# Patient Record
Sex: FEMALE | Race: WHITE | NOT HISPANIC OR LATINO | URBAN - METROPOLITAN AREA
[De-identification: names, ages, dates, MRNs, and addresses within clinical notes are randomized per-mention and may not be internally consistent; named-entity substitution may affect disease eponyms.]

---

## 2023-08-19 ENCOUNTER — LAB (OUTPATIENT)
Dept: LAB | Facility: LAB | Age: 28
End: 2023-08-19
Payer: COMMERCIAL

## 2023-08-19 LAB — TOBACCO SCREEN, URINE: NEGATIVE

## 2024-01-03 ENCOUNTER — CLINICAL SUPPORT (OUTPATIENT)
Dept: AUDIOLOGY | Facility: CLINIC | Age: 29
End: 2024-01-03
Payer: COMMERCIAL

## 2024-01-03 DIAGNOSIS — H93.13 SUBJECTIVE TINNITUS OF BOTH EARS: Primary | ICD-10-CM

## 2024-01-03 PROCEDURE — 92557 COMPREHENSIVE HEARING TEST: CPT | Performed by: AUDIOLOGIST

## 2024-01-03 PROCEDURE — 92550 TYMPANOMETRY & REFLEX THRESH: CPT | Performed by: AUDIOLOGIST

## 2024-01-03 ASSESSMENT — PAIN SCALES - GENERAL: PAINLEVEL_OUTOF10: 0 - NO PAIN

## 2024-01-03 ASSESSMENT — PAIN - FUNCTIONAL ASSESSMENT: PAIN_FUNCTIONAL_ASSESSMENT: 0-10

## 2024-01-03 NOTE — PROGRESS NOTES
Name: Juhi Márquez  YOB: 1995  Age: 28 y.o.    Date of Evaluation:  1/3/2024     History:  Reason for visit:  Juhi Márquez is seen today at the request of Gabi Richard CNP for an evaluation of hearing.  Patient complains of Tinnitus bilaterally. Juhi Márquez denies hearing loss, dizziness, aural fullness, ear infections, ear surgery, loud noise exposure, and family history of hearing loss.   She does report some sinus congestion and fullness.    Evaluation:    Otoscopy revealed clear ear canal and tympanic membrane visualized bilaterally.  Immittance testing indicated normal middle ear pressure, mobility, and ear canal volume bilaterally.   Ipsilateral acoustic reflexes were present at 500-4000 Hz bilaterally.     Behavioral hearing testing indicated normal hearing bilaterally.   Word recognition testing was completed using recorded speech at the patient's most comfortable level as documented on the audiogram.  Scores were excellent bilaterally.      Impression:    Testing indicated normal hearing and middle ear function bilaterally.     Care Plan:    Follow-up with Gabi Richard CNP.  Retest hearing as needed.    ABILIO Sweeney, CCC-A  Audiologist    Time:  [id number]

## 2024-01-12 ENCOUNTER — ANCILLARY PROCEDURE (OUTPATIENT)
Dept: RADIOLOGY | Facility: CLINIC | Age: 29
End: 2024-01-12
Payer: COMMERCIAL

## 2024-01-12 DIAGNOSIS — N93.9 ABNORMAL UTERINE AND VAGINAL BLEEDING, UNSPECIFIED: ICD-10-CM

## 2024-01-12 PROCEDURE — 76830 TRANSVAGINAL US NON-OB: CPT | Performed by: RADIOLOGY

## 2024-01-12 PROCEDURE — 76856 US EXAM PELVIC COMPLETE: CPT | Performed by: RADIOLOGY

## 2024-01-12 PROCEDURE — 76856 US EXAM PELVIC COMPLETE: CPT

## 2024-01-23 RX ORDER — NAPROXEN 500 MG/1
TABLET ORAL
COMMUNITY
Start: 2023-12-07

## 2024-01-23 RX ORDER — FLUTICASONE PROPIONATE 50 MCG
SPRAY, SUSPENSION (ML) NASAL
COMMUNITY
Start: 2023-12-26 | End: 2024-03-11 | Stop reason: ALTCHOICE

## 2024-01-23 RX ORDER — NORETHINDRONE ACETATE AND ETHINYL ESTRADIOL 1MG-20(21)
KIT ORAL
COMMUNITY
Start: 2023-11-24 | End: 2024-01-29 | Stop reason: WASHOUT

## 2024-01-29 ENCOUNTER — LAB (OUTPATIENT)
Dept: LAB | Facility: LAB | Age: 29
End: 2024-01-29
Payer: COMMERCIAL

## 2024-01-29 ENCOUNTER — OFFICE VISIT (OUTPATIENT)
Dept: OTOLARYNGOLOGY | Facility: CLINIC | Age: 29
End: 2024-01-29
Payer: COMMERCIAL

## 2024-01-29 VITALS
TEMPERATURE: 98.1 F | BODY MASS INDEX: 40.78 KG/M2 | SYSTOLIC BLOOD PRESSURE: 120 MMHG | DIASTOLIC BLOOD PRESSURE: 80 MMHG | WEIGHT: 216 LBS | HEIGHT: 61 IN | HEART RATE: 83 BPM

## 2024-01-29 DIAGNOSIS — H57.9 ITCHY EYES: ICD-10-CM

## 2024-01-29 DIAGNOSIS — R44.8 FACIAL PRESSURE: ICD-10-CM

## 2024-01-29 DIAGNOSIS — R09.81 NASAL CONGESTION: Primary | ICD-10-CM

## 2024-01-29 DIAGNOSIS — R06.7 SNEEZING: ICD-10-CM

## 2024-01-29 DIAGNOSIS — J34.89 NASAL DRAINAGE: ICD-10-CM

## 2024-01-29 DIAGNOSIS — R09.81 NASAL CONGESTION: ICD-10-CM

## 2024-01-29 PROCEDURE — 99204 OFFICE O/P NEW MOD 45 MIN: CPT | Performed by: NURSE PRACTITIONER

## 2024-01-29 PROCEDURE — 82785 ASSAY OF IGE: CPT

## 2024-01-29 PROCEDURE — 36415 COLL VENOUS BLD VENIPUNCTURE: CPT

## 2024-01-29 PROCEDURE — 99214 OFFICE O/P EST MOD 30 MIN: CPT | Performed by: NURSE PRACTITIONER

## 2024-01-29 PROCEDURE — 1036F TOBACCO NON-USER: CPT | Performed by: NURSE PRACTITIONER

## 2024-01-29 PROCEDURE — 86003 ALLG SPEC IGE CRUDE XTRC EA: CPT

## 2024-01-29 RX ORDER — LORATADINE 10 MG/1
10 TABLET ORAL
COMMUNITY
Start: 2023-11-07

## 2024-01-29 RX ORDER — ALBUTEROL SULFATE 90 UG/1
AEROSOL, METERED RESPIRATORY (INHALATION)
COMMUNITY
Start: 2022-07-18

## 2024-01-29 RX ORDER — PSEUDOEPHEDRINE HYDROCHLORIDE 60 MG/1
60 TABLET ORAL EVERY 6 HOURS PRN
COMMUNITY
Start: 2023-11-07

## 2024-01-29 RX ORDER — BISACODYL 5 MG
10 TABLET, DELAYED RELEASE (ENTERIC COATED) ORAL
COMMUNITY
Start: 2023-12-07

## 2024-01-29 RX ORDER — MEDROXYPROGESTERONE ACETATE 150 MG/ML
150 INJECTION, SUSPENSION INTRAMUSCULAR
COMMUNITY
Start: 2023-12-07

## 2024-01-29 RX ORDER — ACETAMINOPHEN 500 MG
2000 TABLET ORAL
COMMUNITY
Start: 2023-12-11

## 2024-01-29 RX ORDER — TRIAMCINOLONE ACETONIDE 55 UG/1
SPRAY, METERED NASAL
Qty: 16.5 G | Refills: 1 | Status: SHIPPED | OUTPATIENT
Start: 2024-01-29

## 2024-01-29 RX ORDER — AZELASTINE 1 MG/ML
SPRAY, METERED NASAL
Qty: 30 ML | Refills: 11 | Status: SHIPPED | OUTPATIENT
Start: 2024-01-29

## 2024-01-29 RX ORDER — UBIDECARENONE 75 MG
500 CAPSULE ORAL
COMMUNITY
Start: 2023-12-11

## 2024-01-29 SDOH — ECONOMIC STABILITY: FOOD INSECURITY: WITHIN THE PAST 12 MONTHS, THE FOOD YOU BOUGHT JUST DIDN'T LAST AND YOU DIDN'T HAVE MONEY TO GET MORE.: NEVER TRUE

## 2024-01-29 SDOH — ECONOMIC STABILITY: FOOD INSECURITY: WITHIN THE PAST 12 MONTHS, YOU WORRIED THAT YOUR FOOD WOULD RUN OUT BEFORE YOU GOT MONEY TO BUY MORE.: NEVER TRUE

## 2024-01-29 ASSESSMENT — ENCOUNTER SYMPTOMS
DEPRESSION: 0
OCCASIONAL FEELINGS OF UNSTEADINESS: 0
LOSS OF SENSATION IN FEET: 0

## 2024-01-29 ASSESSMENT — LIFESTYLE VARIABLES
HOW OFTEN DO YOU HAVE SIX OR MORE DRINKS ON ONE OCCASION: NEVER
SKIP TO QUESTIONS 9-10: 1
HOW OFTEN DO YOU HAVE A DRINK CONTAINING ALCOHOL: 2-4 TIMES A MONTH
HOW MANY STANDARD DRINKS CONTAINING ALCOHOL DO YOU HAVE ON A TYPICAL DAY: 1 OR 2
AUDIT-C TOTAL SCORE: 2

## 2024-01-29 ASSESSMENT — PATIENT HEALTH QUESTIONNAIRE - PHQ9
SUM OF ALL RESPONSES TO PHQ9 QUESTIONS 1 AND 2: 0
2. FEELING DOWN, DEPRESSED OR HOPELESS: NOT AT ALL
1. LITTLE INTEREST OR PLEASURE IN DOING THINGS: NOT AT ALL

## 2024-01-29 ASSESSMENT — COLUMBIA-SUICIDE SEVERITY RATING SCALE - C-SSRS
6. HAVE YOU EVER DONE ANYTHING, STARTED TO DO ANYTHING, OR PREPARED TO DO ANYTHING TO END YOUR LIFE?: NO
2. HAVE YOU ACTUALLY HAD ANY THOUGHTS OF KILLING YOURSELF?: NO
1. IN THE PAST MONTH, HAVE YOU WISHED YOU WERE DEAD OR WISHED YOU COULD GO TO SLEEP AND NOT WAKE UP?: NO

## 2024-01-29 ASSESSMENT — PAIN SCALES - GENERAL: PAINLEVEL: 0-NO PAIN

## 2024-01-29 NOTE — PROGRESS NOTES
Subjective   Patient ID: Juhi Munoz is a 28 y.o. female who presents for Sinusitis.    HPI  Patient here for reoccurring sinus issues. This has been going on for years, worsened since COVID in 2021. No allergy testing. She gets bad congestion. Mostly at night. She gets nasal drainage down the back of her throat. She feels if she lays on one side,the other clogs up. She gets some facial pressure with dizziness. Denies decreased smell. No previous sinonasal surgery.   She gets itchy eyes and sneezing.   She uses Flonase intermittently for years. No sinus rinses. Has tried saline spray in the past.  She also gets tinnitus in the ears that comes and goes.     There is no problem list on file for this patient.    No past surgical history on file.    Review of Systems    All other systems have been reviewed and are negative for complaints except for those mentioned in history of present illness, past medical history and problem list.    Objective   Physical Exam    Constitutional: No fever, chills, weight loss or weight gain  General appearance: Appears well, well-nourished, well groomed. No acute distress.    Communication: Normal communication    Psychiatric: Oriented to person, place and time. Normal mood and affect.    Neurologic: Cranial nerves II-XII grossly intact and symmetric bilaterally.    Head and Face:  Head: Atraumatic with no masses, lesions or scarring.  Face: Normal symmetry. No scars or deformities.  TMJ: Normal, no trismus.    Eyes: Conjunctiva not edematous or erythematous.     Right Ear: External inspection of ear with no deformity, scars, or masses. EAC is clear.  TM is intact with no sign of infection, effusion, or retraction.  No perforation seen.     Left Ear: External inspection of ear with no deformity, scars, or masses. EAC is clear.  TM is intact with no sign of infection, effusion, or retraction.  No perforation seen.     Nose: External inspection of nose: No nasal lesions, lacerations or  scars. Anterior rhinoscopy with limited visualization past the inferior turbinates. No tenderness on frontal or maxillary sinus palpation.    Oral Cavity/Mouth: Oral cavity and oropharynx mucosa moist and pink. No lesions or masses. Dentition normal. Tonsils appear normal. Uvula is midline. Tongue with no masses or lesions. Tongue with good mobility. The oropharynx is clear.    Neck: Normal appearing, symmetric, trachea midline.     Cardiovascular: Examination of peripheral vascular system shows no clubbing or cyanosis.    Respiratory: No respiratory distress increased work of breathing. Inspection of the chest with symmetric chest expansion and normal respiratory effort.    Skin: No head and neck rashes.    Lymph nodes: No adenopathy.     Diagnostic Results   I personally interpreted recent audiogram:      Assessment/Plan   Diagnoses and all orders for this visit:  Nasal congestion, nasal drainage,  facial pressure, itchy eyes and sneezing  -     Respiratory Allergy Profile IgE; Future. I will call with results.   - Start sinus rinses daily.   -     azelastine (Astelin) 137 mcg (0.1 %) nasal spray; Administer 2 sprays into each nostril twice daily. May continue Loratadine.   -     triamcinolone (Nasacort) 55 mcg nasal inhaler; Administer 2 sprays into each nostril one daily.    Follow up in 6-8 weeks.  If symptoms not improving, we will perform nasal endoscopy and consider additional medical therapy, allergy testing and/or CT sinus.    All questions answered to patient satisfaction.     SOL Hopkins-CNP 01/29/24 8:44 AM

## 2024-01-29 NOTE — PATIENT INSTRUCTIONS
- Start sinus rinses daily, see handout.  This can be purchased over the counter.   - I recommended Nasacort 2 sprays each nostril once a day. Patient was given instruction on proper application of the medication by spraying intranasally and aiming towards the outer corners of the eyes. Patient was instructed to avoid the septum due to the risk of nasal bleeding.  - Patient may add Azelastine nasal spray 2 sprays each nostril twice a day for better allergy control. You may continue your Loratadine.  - Obtain allergy testing. Go to any  lab to get this done.   - Follow up in 6-8 weeks. If not improving, will perform nasal endoscopy.    Welcome to Gabi Richard's clinic. We are here to assist you through your ENT care at [facility].  Gabi is a Nurse Practitioner who specializes in General ENT. This means that she specializes in taking care of patients with usual ENT issues such as nasal congestion, allergy symptoms, sinusitis, hearing loss, ear infections, ear wax removal, hoarseness, sore throat, throat infections, reflux and some swallowing issues. She also sees patients regarding dizziness and vertigo.   Clarita is Gabi's  and she answers the office phone from 7:30am-4pm Mon-Fri. Call [phone]. She can help you with scheduling of appointments, and general questions and information. You may need to leave a message if she is helping another patient. In this case, someone from the team will call you back the same day if you leave your message before 3pm, or the next business morning.  Gabi currently sees patients at [facility] on Mondays, Wednesdays and Thursdays.  She works closely with audiologists to solve issues with hearing. She is also in very close contact with her collaborative physicians. Dr. Chau, a surgeon who specializes in general ENT and rhinology. She also works closely with otologist (ear surgeon) Dr. Duncan and head and neck surgery  Dr. Valverde.   Others who may be included in your care are dieticians, social workers, allergists, gastroenterologists, neurologists, and physical therapists. Gabi will provide these referrals as needed. Please let her know if you would like to request a specific referral.  Gabi makes every effort to run on time for your appointments. Therefore, if you are more than 15 minutes late unrelated to a scan or another appointment such as therapy or audiology, your appointment will need to be rescheduled for another day. We appreciate your understanding.   We look forward to working with you to meet your healthcare goals.

## 2024-01-30 LAB
A ALTERNATA IGE QN: <0.1 KU/L
A FUMIGATUS IGE QN: <0.1 KU/L
BERMUDA GRASS IGE QN: <0.1 KU/L
BOXELDER IGE QN: <0.1 KU/L
C HERBARUM IGE QN: <0.1 KU/L
CALIF WALNUT POLN IGE QN: <0.1 KU/L
CAT DANDER IGE QN: <0.1 KU/L
CMN PIGWEED IGE QN: <0.1 KU/L
COMMON RAGWEED IGE QN: <0.1 KU/L
COTTONWOOD IGE QN: <0.1 KU/L
D FARINAE IGE QN: 1.7 KU/L
D PTERONYSS IGE QN: 1.65 KU/L
DOG DANDER IGE QN: <0.1 KU/L
ENGL PLANTAIN IGE QN: <0.1 KU/L
GOOSEFOOT IGE QN: <0.1 KU/L
JOHNSON GRASS IGE QN: <0.1 KU/L
KENT BLUE GRASS IGE QN: <0.1 KU/L
LONDON PLANE IGE QN: <0.1 KU/L
MT JUNIPER IGE QN: <0.1 KU/L
P NOTATUM IGE QN: <0.1 KU/L
PECAN/HICK TREE IGE QN: <0.1 KU/L
ROACH IGE QN: <0.1 KU/L
SALTWORT IGE QN: <0.1 KU/L
SHEEP SORREL IGE QN: <0.1 KU/L
SILVER BIRCH IGE QN: <0.1 KU/L
TIMOTHY IGE QN: <0.1 KU/L
TOTAL IGE SMQN RAST: 16.1 KU/L
WHITE ASH IGE QN: <0.1 KU/L
WHITE ELM IGE QN: <0.1 KU/L
WHITE MULBERRY IGE QN: <0.1 KU/L
WHITE OAK IGE QN: <0.1 KU/L

## 2024-03-11 ENCOUNTER — OFFICE VISIT (OUTPATIENT)
Dept: OTOLARYNGOLOGY | Facility: CLINIC | Age: 29
End: 2024-03-11
Payer: COMMERCIAL

## 2024-03-11 VITALS
BODY MASS INDEX: 41.72 KG/M2 | SYSTOLIC BLOOD PRESSURE: 130 MMHG | HEIGHT: 61 IN | DIASTOLIC BLOOD PRESSURE: 83 MMHG | HEART RATE: 76 BPM | TEMPERATURE: 97.6 F | WEIGHT: 221 LBS

## 2024-03-11 DIAGNOSIS — K21.9 LARYNGOPHARYNGEAL REFLUX (LPR): ICD-10-CM

## 2024-03-11 DIAGNOSIS — H57.9 ITCHY EYES: ICD-10-CM

## 2024-03-11 DIAGNOSIS — R06.7 SNEEZING: ICD-10-CM

## 2024-03-11 DIAGNOSIS — R09.A2 GLOBUS SENSATION: ICD-10-CM

## 2024-03-11 DIAGNOSIS — J34.89 NASAL DRAINAGE: Primary | ICD-10-CM

## 2024-03-11 DIAGNOSIS — R09.89 PHLEGM IN THROAT: ICD-10-CM

## 2024-03-11 PROCEDURE — 31231 NASAL ENDOSCOPY DX: CPT | Performed by: NURSE PRACTITIONER

## 2024-03-11 PROCEDURE — 99213 OFFICE O/P EST LOW 20 MIN: CPT | Performed by: NURSE PRACTITIONER

## 2024-03-11 PROCEDURE — 1036F TOBACCO NON-USER: CPT | Performed by: NURSE PRACTITIONER

## 2024-03-11 RX ORDER — OMEPRAZOLE 40 MG/1
CAPSULE, DELAYED RELEASE ORAL
Qty: 30 CAPSULE | Refills: 1 | Status: SHIPPED | OUTPATIENT
Start: 2024-03-11

## 2024-03-11 ASSESSMENT — ENCOUNTER SYMPTOMS
DEPRESSION: 0
OCCASIONAL FEELINGS OF UNSTEADINESS: 0
LOSS OF SENSATION IN FEET: 0

## 2024-03-11 ASSESSMENT — PAIN SCALES - GENERAL: PAINLEVEL: 0-NO PAIN

## 2024-03-11 NOTE — PATIENT INSTRUCTIONS
"- Continue Flonase and Azelastine.   - Start Omeprazole 40 mg daily. Take this before breakfast. Follow dietary/lifestyle modifications as listed below.  - Follow up in 8 weeks.     LARYNGOPHARYNGEAL REFLUX (LPR)   A common cause of hoarseness or voice changes is gastroesophageal reflux disease (GERD). GERD occurs when stomach acid flows back up into the esophagus (swallowing tube). When the acid reaches the throat, it is called laryngopharyngeal reflux (LPR) or silent reflux. It is called \"silent\" because up to 50% of people with LPR have no heartburn or stomach upset. This reflux can cause inflammation and swelling in the throat or in the larynx (voice box) and can result in a number of different symptoms.  · Hoarseness  · Sensation of lump in the throat (globus sensation)   · Frequent throat clearing  · Cough (may wake you up at night)  · Mucous sticking in the throat  · Low grade sore throat  · Worsening of asthma   · Worsening of sinus drainage or post nasal drip    The diagnosis of LPR as a cause of throat symptoms is usually based on classic symptoms and physical findings of inflammation in the throat (back part of the voicebox) that is assessed by performing a small in office procedure called a flexible nasopharyngoscopy. Often the patient is treated for LPR without any further testing and more significant testing is usually not needed. However, on some occasion, some additional tests may be required such as a swallowing study with barium, an endoscopic evaluation of the esophagus and stomach, or a probe that measures acidity (pH) in the throat and esophagus.  The most important treatment of LPR is lifestyle changes and dietary control. Lifestyle changes include talking with your primary healthcare provider about losing weight if you are currently overweight.  If you smoke, quit! Your primary healthcare provider will have suggestions to help you quit smoking. Avoid eating anything for at least 3-4 hours " before bedtime.  Elevate the head of the bed by using extra pillows or placing blocks under the legs of the bed to help reduce the amount of acid reaching your throat as you lay down. Avoid tight, binding. Below is a list of dietary guidelines:  · Eat smaller, more frequent meals rather than large one.   · Avoid food or liquids for 2-3 hours before lying down (no bedtime snacks!)   · Avoid or limit caffeinated products such as coffee, tea, soda, and chocolate  · Avoid or limit red sauces and salsa  · Avoid or limit fatty, fried, spicy or greasy foods  · Avoid or limit citric juices such as orange and grapefruit juice  · Avoid or limit mints such as peppermint and spearmint  · Avoid or limit alcohol   You may be prescribed anti-reflux medication.  These are most commonly the class of protein pump inhibitors such as Omeprazole, Esomeprazole, Pantoprazole, and Lansoprazole. These medications act by preventing acid production, not by neutralizing acid already present in the stomach and need to be taken 30 minutes before your biggest meal.  These medications can take up to 4 weeks to have effect, so it is important to take these consistently along with lifestyle and dietary modifications until your follow up appointment.    Untreated LPR can lead to chronic swelling of the vocal folds, ulcerations of the vocal folds and formation of masses known as granulomas.    Welcome to Gabi Richard's clinic. We are here to assist you through your ENT care at [facility].  Gabi is a Nurse Practitioner who specializes in General ENT. This means that she specializes in taking care of patients with usual ENT issues such as nasal congestion, allergy symptoms, sinusitis, hearing loss, ear infections, ear wax removal, hoarseness, sore throat, throat infections, reflux and some swallowing issues. She also sees patients regarding dizziness and vertigo.   Clarita is Gabi's  and she answers the office phone from  7:30am-4pm Mon-Fri. Call [phone]. She can help you with scheduling of appointments, and general questions and information. You may need to leave a message if she is helping another patient. In this case, someone from the team will call you back the same day if you leave your message before 3pm, or the next business morning.  Gabi currently sees patients at [facility] on Mondays, Wednesdays and Thursdays.  She works closely with audiologists to solve issues with hearing. She is also in very close contact with her collaborative physicians. Dr. Chau, a surgeon who specializes in general ENT and rhinology. She also works closely with otologist (ear surgeon) Dr. Duncan and head and neck surgery Dr. Valverde.   Others who may be included in your care are dieticians, social workers, allergists, gastroenterologists, neurologists, and physical therapists. Gabi will provide these referrals as needed. Please let her know if you would like to request a specific referral.  Gabi makes every effort to run on time for your appointments. Therefore, if you are more than 15 minutes late unrelated to a scan or another appointment such as therapy or audiology, your appointment will need to be rescheduled for another day. We appreciate your understanding.   We look forward to working with you to meet your healthcare goals.

## 2024-03-11 NOTE — PROGRESS NOTES
Subjective   Patient ID: Juhi Munoz is a 28 y.o. female who presents for Follow-up (6 week sinus).    HPI  INITIAL VISIT 1/29/2024:  Patient here for reoccurring sinus issues. This has been going on for years, worsened since COVID in 2021. No allergy testing. She gets bad congestion. Mostly at night. She gets nasal drainage down the back of her throat. She feels if she lays on one side,the other clogs up. She gets some facial pressure with dizziness. Denies decreased smell. No previous sinonasal surgery.   She gets itchy eyes and sneezing.   She uses Flonase intermittently for years. No sinus rinses. Has tried saline spray in the past.  She also gets tinnitus in the ears that comes and goes.     3/11/2024: Patient following up for her sinonasal complaints. Didn't do the rinses.Taking the Nasacort and Azelastine regularly- she does forget sometimes. Still getting mucous down the back of her throat. The congestion has much improved. No facial pain or pressure.   She does get acid reflux/heartburn. Doesn't take any medication for this.     There is no problem list on file for this patient.    History reviewed. No pertinent surgical history.    Review of Systems    All other systems have been reviewed and are negative for complaints except for those mentioned in history of present illness, past medical history and problem list.    Objective   Physical Exam    Constitutional: No fever, chills, weight loss or weight gain  General appearance: Appears well, well-nourished, well groomed. No acute distress.    Communication: Normal communication    Psychiatric: Oriented to person, place and time. Normal mood and affect.    Neurologic: Cranial nerves II-XII grossly intact and symmetric bilaterally.    Head and Face:  Head: Atraumatic with no masses, lesions or scarring.  Face: Normal symmetry. No scars or deformities.  TMJ: Normal, no trismus.    Eyes: Conjunctiva not edematous or erythematous.     Right Ear: External  inspection of ear with no deformity, scars, or masses. EAC is clear.  TM is intact with no sign of infection, effusion, or retraction.  No perforation seen.     Left Ear: External inspection of ear with no deformity, scars, or masses. EAC is clear.  TM is intact with no sign of infection, effusion, or retraction.  No perforation seen.     Nose: External inspection of nose: No nasal lesions, lacerations or scars. Anterior rhinoscopy with limited visualization past the inferior turbinates. No tenderness on frontal or maxillary sinus palpation.    Oral Cavity/Mouth: Oral cavity and oropharynx mucosa moist and pink. No lesions or masses. Dentition normal. Tonsils appear normal. Uvula is midline. Tongue with no masses or lesions. Tongue with good mobility. The oropharynx is clear.    Neck: Normal appearing, symmetric, trachea midline.     Cardiovascular: Examination of peripheral vascular system shows no clubbing or cyanosis.    Respiratory: No respiratory distress increased work of breathing. Inspection of the chest with symmetric chest expansion and normal respiratory effort.    Skin: No head and neck rashes.    Lymph nodes: No adenopathy.     Nasal / sinus endoscopy    Date/Time: 3/11/2024 8:51 AM    Performed by: CARLOS Hopkins  Authorized by: CARLOS Hopkins    Consent:     Consent obtained:  Verbal  Procedure details:     Indications: sino-nasal symptoms      Medication:  Afrin (4% topical lidcoaine)    Instrument: flexible fiberoptic nasal endoscope      Scope location: bilateral nare    Nasal cavity:     right nasal cavity not occluded, left nasal cavity not occluded, no right nasal cavity polyps and no left nasal cavity polyps      Right inferior turbinates:      normal    Left inferior turbinates:      normal    Septum:     normal    Sinus:     Right middle meatus:       normal        patent        no purulence      Left middle meatus:       normal        patent      no purulence     Right nasopharynx:       normal      patent      adenoid hypertrophy    Left nasopharynx:       normal      patent      adenoid hypertrophy    Right Eustachian tube orifices:       normal      patent    Left Eustachian tube orifices:       normal      patent  Comments:      Adenoids inflamed, enlarged.   Brief view of larynx shows postcricoid edema, evidence of LPR. No lesions.     Diagnostic Results   I personally interpreted recent audiogram:      Respiratory allergy profile obtained 1/29/2024 moderately positive to dust mites ( D. Farinae and D. Pteronyssinus)  Assessment/Plan   Diagnoses and all orders for this visit:  Nasal endoscopy performed. Brief view of larynx showed evidence of LPR.  Nasal drainage,  facial pressure, itchy eyes and sneezing, phlegm in throat.  Continue Nasacort and Azelastine.   Globus Sensation, LPR  Start Omeprazole daily before breakfast. This was sent to pharmacy.  Follow dietary/lifestyle modifications.     Follow up in 6-8 weeks.  All questions answered to patient satisfaction.     SOL Hopkins-CNP 03/11/24 8:36 AM

## 2024-05-01 ENCOUNTER — OFFICE VISIT (OUTPATIENT)
Dept: OTOLARYNGOLOGY | Facility: CLINIC | Age: 29
End: 2024-05-01
Payer: COMMERCIAL

## 2024-05-01 VITALS — WEIGHT: 223 LBS | TEMPERATURE: 97.7 F | HEIGHT: 61 IN | BODY MASS INDEX: 42.1 KG/M2

## 2024-05-01 DIAGNOSIS — R06.7 SNEEZING: ICD-10-CM

## 2024-05-01 DIAGNOSIS — J34.89 NASAL DRAINAGE: ICD-10-CM

## 2024-05-01 DIAGNOSIS — R09.89 PHLEGM IN THROAT: ICD-10-CM

## 2024-05-01 DIAGNOSIS — H57.9 ITCHY EYES: ICD-10-CM

## 2024-05-01 DIAGNOSIS — K21.9 LARYNGOPHARYNGEAL REFLUX (LPR): Primary | ICD-10-CM

## 2024-05-01 PROCEDURE — 99213 OFFICE O/P EST LOW 20 MIN: CPT | Performed by: NURSE PRACTITIONER

## 2024-05-01 PROCEDURE — 1036F TOBACCO NON-USER: CPT | Performed by: NURSE PRACTITIONER

## 2024-05-01 ASSESSMENT — ENCOUNTER SYMPTOMS
DEPRESSION: 0
OCCASIONAL FEELINGS OF UNSTEADINESS: 0
LOSS OF SENSATION IN FEET: 0

## 2024-05-01 ASSESSMENT — PAIN SCALES - GENERAL: PAINLEVEL: 0-NO PAIN

## 2024-05-01 NOTE — PROGRESS NOTES
Subjective   Patient ID: Juhi Munoz is a 28 y.o. female who presents for No chief complaint on file..    HPI  INITIAL VISIT 1/29/2024:  Patient here for reoccurring sinus issues. This has been going on for years, worsened since COVID in 2021. No allergy testing. She gets bad congestion. Mostly at night. She gets nasal drainage down the back of her throat. She feels if she lays on one side,the other clogs up. She gets some facial pressure with dizziness. Denies decreased smell. No previous sinonasal surgery.   She gets itchy eyes and sneezing.   She uses Flonase intermittently for years. No sinus rinses. Has tried saline spray in the past.  She also gets tinnitus in the ears that comes and goes.     3/11/2024: Patient following up for her sinonasal complaints. Didn't do the rinses. Taking the Nasacort and Azelastine regularly- she does forget sometimes. Still getting mucous down the back of her throat. The congestion has much improved. No facial pain or pressure.   She does get acid reflux/heartburn. Doesn't take any medication for this.     5/1/2024: Patient here for follow up. The Omeprazole is helping significantly . If she forgets the medication, symptoms worsen. Throat is feeling better. Does still get congested, using the Nasacort and Azelastine. Takes Loratadine daily.   Gets headaches when she lays flat. Sometimes her vision is blurry. Some facial pressure.     There is no problem list on file for this patient.    No past surgical history on file.    Review of Systems    All other systems have been reviewed and are negative for complaints except for those mentioned in history of present illness, past medical history and problem list.    Objective   Physical Exam    Constitutional: No fever, chills, weight loss or weight gain  General appearance: Appears well, well-nourished, well groomed. No acute distress.    Communication: Normal communication    Psychiatric: Oriented to person, place and time. Normal  mood and affect.    Neurologic: Cranial nerves II-XII grossly intact and symmetric bilaterally.    Head and Face:  Head: Atraumatic with no masses, lesions or scarring.  Face: Normal symmetry. No scars or deformities.  TMJ: Normal, no trismus.    Eyes: Conjunctiva not edematous or erythematous.     Right Ear: External inspection of ear with no deformity, scars, or masses. EAC is clear.  TM is intact with no sign of infection, effusion, or retraction.  No perforation seen.     Left Ear: External inspection of ear with no deformity, scars, or masses. EAC is clear.  TM is intact with no sign of infection, effusion, or retraction.  No perforation seen.     Nose: External inspection of nose: No nasal lesions, lacerations or scars. Anterior rhinoscopy with limited visualization past the inferior turbinates. No tenderness on frontal or maxillary sinus palpation.    Oral Cavity/Mouth: Oral cavity and oropharynx mucosa moist and pink. No lesions or masses. Dentition normal. Tonsils appear normal. Uvula is midline. Tongue with no masses or lesions. Tongue with good mobility. The oropharynx is clear.    Neck: Normal appearing, symmetric, trachea midline.     Cardiovascular: Examination of peripheral vascular system shows no clubbing or cyanosis.    Respiratory: No respiratory distress increased work of breathing. Inspection of the chest with symmetric chest expansion and normal respiratory effort.    Skin: No head and neck rashes.    Lymph nodes: No adenopathy.     Diagnostic Results   I personally interpreted recent audiogram:      Respiratory allergy profile obtained 1/29/2024 moderately positive to dust mites ( D. Farinae and D. Pteronyssinus)    Assessment/Plan   Diagnoses and all orders for this visit:  Patient will continue Omeprazole. Taper to every other day for the last week. I referred to GI, as when she misses medication her symptoms return.  I also placed allergy referral due to patient's nasal congestion, itchy  nose/eyes and sneezing. Continue Nasacort, Azelastine and Loratadine.   We discussed CT sinus due to facial pressure, but patient would like to think about this. Will let me know if she wants to pursue this. Otherwise, we can follow up as needed as she sees additional providers.     All questions answered to patient satisfaction.     SOL Hopkins-CNP 05/01/24 8:13 AM

## 2024-07-11 ENCOUNTER — APPOINTMENT (OUTPATIENT)
Dept: GASTROENTEROLOGY | Facility: CLINIC | Age: 29
End: 2024-07-11
Payer: COMMERCIAL

## 2024-08-28 NOTE — PROGRESS NOTES
Subjective   Patient ID: Juhi Munoz is a 28 y.o. female who presents for GERD (Has had GERD for many years, but now it is getting worse with constant nausea/bloating/abdominal pain/excessive gas. Also has issues with constipation/diarrhea- gets rectal and back pain that feels like someone is stabbing her. ).  HPI  Patient is a 28-year-old woman with morbid obesity and anxiety.  She is currently not on any occasions.  Symptom: > 2 years.  Nausea ; almost daily. Has been there for a 2 year.  GERD /Burping up food. PPI has helped.   Flatulence and bloating.  Generalized abdominal discomfort.  Sometimes problem swallowing.  Lower abdominal pain.     No wt. Loss.  BM: Few times a day.      Anxiety is better.      Current Outpatient Medications on File Prior to Visit   Medication Sig Dispense Refill    albuterol (Ventolin HFA) 90 mcg/actuation inhaler INHALE 2 PUFFS INTO THE LUNGS EVERY 4 HOURS AS NEEDED FOR SHORTNESS OF BREATH OR WHEEZING      azelastine (Astelin) 137 mcg (0.1 %) nasal spray Administer 2 sprays into each nostril twice daily. 30 mL 11    bisacodyl (Dulcolax) 5 mg EC tablet Take 2 tablets (10 mg) by mouth.      cholecalciferol (Vitamin D-3) 50 mcg (2,000 unit) capsule Take 1 capsule (50 mcg) by mouth once daily.      cyanocobalamin (Vitamin B-12) 500 mcg tablet Take 1 tablet (500 mcg) by mouth once daily.      loratadine (Claritin) 10 mg tablet Take 1 tablet (10 mg) by mouth.      naproxen (Naprosyn) 500 mg tablet       pseudoephedrine (Sudafed) 60 mg tablet Take 1 tablet (60 mg) by mouth every 6 hours if needed.      triamcinolone (Nasacort) 55 mcg nasal inhaler Administer 2 sprays into each nostril one daily. 16.5 g 1    medroxyPROGESTERone 150 mg/mL injection Inject 1 mL (150 mg) into the muscle.      omeprazole (PriLOSEC) 40 mg DR capsule Take daily before breakfast. (Patient not taking: Reported on 8/29/2024) 30 capsule 1     No current facility-administered medications on file prior to visit.     "    Review of Systems   Constitutional: Negative.    Respiratory: Negative.     Cardiovascular: Negative.    Gastrointestinal:  Positive for abdominal distention, abdominal pain, constipation, diarrhea and nausea.   Endocrine: Negative.    Genitourinary: Negative.    Skin: Negative.    Neurological: Negative.        Objective   Physical Exam  Constitutional:       Appearance: Normal appearance.   HENT:      Head: Normocephalic and atraumatic.   Cardiovascular:      Rate and Rhythm: Normal rate and regular rhythm.      Pulses: Normal pulses.      Heart sounds: Normal heart sounds.   Pulmonary:      Effort: Pulmonary effort is normal.      Breath sounds: Normal breath sounds.   Abdominal:      General: Abdomen is flat. Bowel sounds are normal.      Palpations: Abdomen is soft.      Tenderness: There is abdominal tenderness.   Musculoskeletal:         General: Normal range of motion.      Cervical back: Normal range of motion.   Skin:     General: Skin is warm.   Neurological:      Mental Status: She is alert.       /80 (BP Location: Right arm, Patient Position: Sitting, BP Cuff Size: Large adult)   Pulse 76   Resp 18   Ht 1.556 m (5' 1.25\")   Wt 101 kg (222 lb 6.4 oz)   SpO2 98%   BMI 41.68 kg/m²      No results found for: \"WBC\", \"HGB\", \"HCT\", \"MCV\", \"PLT\"        No lab exists for component: \"LABALBU\"    No results found for: \"AFP\"  No results found for: \"TSH\"    Nasal / sinus endoscopy  Order: [id number]   Status: Final result       Visible to patient: Yes (seen)       Next appt: 08/29/2024 at 08:20 AM in Gastroenterology (Luis Luna MD)       Dx: Nasal drainage; Phlegm in throat    0 Result Notes        Narrative    CARLOS Hopkins     3/11/2024 10:51 AM  Nasal / sinus endoscopy    Date/Time: 3/11/2024 8:51 AM    Performed by: CARLOS Hopkins  Authorized by: CARLOS Hopkins    Consent:    Consent obtained:  Verbal  Procedure details:    Indications: sino-nasal " symptoms      Medication:  Afrin (4% topical lidcoaine)    Instrument: flexible fiberoptic nasal endoscope      Scope location: bilateral nare    Nasal cavity:    right nasal cavity not occluded, left nasal cavity not occluded, no  right nasal cavity polyps and no left nasal cavity polyps      Right inferior turbinates:      normal    Left inferior turbinates:      normal    Septum:    normal    Sinus:    Right middle meatus:      normal        patent        no purulence      Left middle meatus:      normal        patent      no purulence    Right nasopharynx:      normal      patent      adenoid hypertrophy    Left nasopharynx:      normal      patent      adenoid hypertrophy    Right Eustachian tube orifices:      normal      patent    Left Eustachian tube orifices:      normal      patent  Comments:     Adenoids inflamed, enlarged.  Brief view of larynx shows postcricoid edema, evidence of LPR. No lesions.        Assessment/Plan   Diagnoses and all orders for this visit:  Bloating  -     Esophagogastroduodenoscopy (EGD); Future  Laryngopharyngeal reflux (LPR)  -     Referral to Gastroenterology  -     Esophagogastroduodenoscopy (EGD); Future  Lower abdominal pain  -     Colonoscopy Diagnostic; Future  -     sodium,potassium,mag sulfates (Suprep) 17.5-3.13-1.6 gram recon soln solution; Take one bottle twice as directed by the prep instructions  -     Calprotectin Stool; Future  -     Celiac Panel; Future  -     CBC; Future  -     Comprehensive metabolic panel; Future  Nausea  -     Esophagogastroduodenoscopy (EGD); Future  -     Calprotectin Stool; Future  -     Celiac Panel; Future  -     CBC; Future  -     Comprehensive metabolic panel; Future    Low FODMAP diet.  In addition > Meditation or Yoga daily.  Follow up in 6 months.

## 2024-08-29 ENCOUNTER — APPOINTMENT (OUTPATIENT)
Dept: GASTROENTEROLOGY | Facility: CLINIC | Age: 29
End: 2024-08-29
Payer: COMMERCIAL

## 2024-08-29 VITALS
SYSTOLIC BLOOD PRESSURE: 124 MMHG | RESPIRATION RATE: 18 BRPM | BODY MASS INDEX: 41.99 KG/M2 | HEART RATE: 76 BPM | HEIGHT: 61 IN | WEIGHT: 222.4 LBS | OXYGEN SATURATION: 98 % | DIASTOLIC BLOOD PRESSURE: 80 MMHG

## 2024-08-29 DIAGNOSIS — K21.9 LARYNGOPHARYNGEAL REFLUX (LPR): ICD-10-CM

## 2024-08-29 DIAGNOSIS — R14.0 BLOATING: Primary | ICD-10-CM

## 2024-08-29 DIAGNOSIS — R11.0 NAUSEA: ICD-10-CM

## 2024-08-29 DIAGNOSIS — R10.30 LOWER ABDOMINAL PAIN: ICD-10-CM

## 2024-08-29 PROCEDURE — 3008F BODY MASS INDEX DOCD: CPT | Performed by: INTERNAL MEDICINE

## 2024-08-29 PROCEDURE — 1036F TOBACCO NON-USER: CPT | Performed by: INTERNAL MEDICINE

## 2024-08-29 PROCEDURE — 99205 OFFICE O/P NEW HI 60 MIN: CPT | Performed by: INTERNAL MEDICINE

## 2024-08-29 RX ORDER — SODIUM, POTASSIUM,MAG SULFATES 17.5-3.13G
SOLUTION, RECONSTITUTED, ORAL ORAL
Qty: 354 ML | Refills: 0 | Status: SHIPPED | OUTPATIENT
Start: 2024-08-29

## 2024-08-29 ASSESSMENT — ENCOUNTER SYMPTOMS
ABDOMINAL DISTENTION: 1
CARDIOVASCULAR NEGATIVE: 1
NEUROLOGICAL NEGATIVE: 1
DIARRHEA: 1
NAUSEA: 1
ABDOMINAL PAIN: 1
CONSTITUTIONAL NEGATIVE: 1
ENDOCRINE NEGATIVE: 1
CONSTIPATION: 1
RESPIRATORY NEGATIVE: 1

## 2024-08-29 NOTE — PATIENT INSTRUCTIONS
Bloating  -     Esophagogastroduodenoscopy (EGD); Future  Laryngopharyngeal reflux (LPR)  -     Referral to Gastroenterology  -     Esophagogastroduodenoscopy (EGD); Future  Lower abdominal pain  -     Colonoscopy Diagnostic; Future  -     sodium,potassium,mag sulfates (Suprep) 17.5-3.13-1.6 gram recon soln solution; Take one bottle twice as directed by the prep instructions  -     Calprotectin Stool; Future  -     Celiac Panel; Future  -     CBC; Future  -     Comprehensive metabolic panel; Future  Nausea  -     Esophagogastroduodenoscopy (EGD); Future  -     Calprotectin Stool; Future  -     Celiac Panel; Future  -     CBC; Future  -     Comprehensive metabolic panel; Future    Low FODMAP diet.  In addition > Meditation or Yoga daily.  Follow up in 6 months.

## 2024-08-30 ENCOUNTER — TELEPHONE (OUTPATIENT)
Dept: GASTROENTEROLOGY | Facility: CLINIC | Age: 29
End: 2024-08-30
Payer: COMMERCIAL

## 2024-09-02 PROBLEM — J30.9 ALLERGIC RHINITIS: Status: ACTIVE | Noted: 2024-09-02

## 2024-09-03 ENCOUNTER — APPOINTMENT (OUTPATIENT)
Dept: ALLERGY | Facility: CLINIC | Age: 29
End: 2024-09-03
Payer: COMMERCIAL

## 2024-09-03 VITALS — HEIGHT: 61 IN | BODY MASS INDEX: 42.29 KG/M2 | WEIGHT: 224 LBS | HEART RATE: 91 BPM

## 2024-09-03 DIAGNOSIS — R06.7 SNEEZING: ICD-10-CM

## 2024-09-03 DIAGNOSIS — R09.89 PHLEGM IN THROAT: ICD-10-CM

## 2024-09-03 DIAGNOSIS — J30.9 ALLERGIC RHINITIS, UNSPECIFIED SEASONALITY, UNSPECIFIED TRIGGER: Primary | ICD-10-CM

## 2024-09-03 DIAGNOSIS — H57.9 ITCHY EYES: ICD-10-CM

## 2024-09-03 DIAGNOSIS — J34.89 NASAL DRAINAGE: ICD-10-CM

## 2024-09-03 PROCEDURE — 95024 IQ TESTS W/ALLERGENIC XTRCS: CPT | Performed by: ALLERGY & IMMUNOLOGY

## 2024-09-03 PROCEDURE — 99214 OFFICE O/P EST MOD 30 MIN: CPT | Performed by: ALLERGY & IMMUNOLOGY

## 2024-09-03 PROCEDURE — 95004 PERQ TESTS W/ALRGNC XTRCS: CPT | Performed by: ALLERGY & IMMUNOLOGY

## 2024-09-03 RX ORDER — MONTELUKAST SODIUM 10 MG/1
10 TABLET ORAL DAILY
Qty: 30 TABLET | Refills: 5 | Status: SHIPPED | OUTPATIENT
Start: 2024-09-03 | End: 2025-03-02

## 2024-09-03 ASSESSMENT — ENCOUNTER SYMPTOMS
EYE ITCHING: 1
RHINORRHEA: 1

## 2024-09-03 NOTE — PATIENT INSTRUCTIONS
Skin testing is positive to dust mite.    Start Singulair at bedtime to help decrease sinus and lung inflammation.  Side effects reported vivid dreams,  mood changes as well as ear popping.  If you experience ear popping, this indicates the medication is working so please continue to take it.     Continue Nasacort and azelastine.  To increase the efficacy of your nasal spray, be sure to look down while using it.? Spray slightly away from the direction of your nasal septum (the bone in the middle of your nose) and only sniff after you have sprayed - avoid spraying and sniffing at the same time, or else a lot of spray will go down your throat.      Consider sublingual Odactra vs allergy immunotherapy.

## 2024-09-03 NOTE — ASSESSMENT & PLAN NOTE
She C/O years of recurring sinus issues worse since she had COVID in 2021.  She experiences severe nasal congestion mostly at night, PND, facial pressure, dizziness, itchy eyes and ears and sneezing despite Flonase, azelastine and Nasacort.    Skin testing is positive to dust mite.    She can continue her Flonase, Nasacort and azelastine.    Start Singulair (montelukast) at night.  Reviewed side effects and benefits.      I discussed the options of sublingual Odactra vs allergy immunotherapy.

## 2024-09-03 NOTE — PROGRESS NOTES
"  Subjective   Patient ID:   [id number]   Juhi Munoz is a 28 y.o. female who presents for Allergies.    Chief Complaint   Patient presents with    Allergies     This is a new patient, referred by CARLOS Hopkins, Otolaryngology, for evaluation of sinus complaints.    Patient reports years of recurring sinus issues that have worsened since COVID in 2021.  She experiences severe nasal congestion mostly at night, which alternates from left to right nostril depending on the side she lies on.  However, the congestion has improved.  She also experienced PND, facial pressure, dizziness, itchy eyes and ears and sneezing.  She used Visine intermittently but admits to dry eyes.  She has intermittent tinnitus but denies anosmia.  She has had no prior sinonasal surgery.    She uses Flonase, Nasacort and azelastine, which help but do not completely resolve her chronic congestion and eye itching.  She also uses loratadine but still sneezes especially at work.        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  She took omeprazole, which helped but she was told to stop it to avoid GI issues.  Patient saw Gabi Richard who performed sinonasal endoscopy and was told she had large adenoids, but they were mild.  She does not recall Gabi citing any other findings.    Patient denies any breathing issues or H/O asthma.    Patient works at [employer] as a nurse x3 years.    Review of Systems   HENT:  Positive for congestion, postnasal drip and rhinorrhea.    Eyes:  Positive for itching.        Dry eyes.     Objective   Pulse 91   Ht 1.556 m (5' 1.25\")   Wt 102 kg (224 lb)   BMI 41.98 kg/m²      Physical Exam  Constitutional:       Appearance: Normal appearance.   HENT:      Head: Normocephalic and atraumatic.      Right Ear: External ear normal. There is no impacted cerumen.      Left Ear: External ear normal. There is no impacted cerumen.      Nose: No congestion or " rhinorrhea.   Eyes:      Extraocular Movements: Extraocular movements intact.      Conjunctiva/sclera: Conjunctivae normal.      Pupils: Pupils are equal, round, and reactive to light.   Cardiovascular:      Rate and Rhythm: Normal rate and regular rhythm.      Heart sounds: No murmur heard.     No friction rub. No gallop.   Pulmonary:      Effort: No respiratory distress.      Breath sounds: No wheezing, rhonchi or rales.   Skin:     General: Skin is warm and dry.   Neurological:      Mental Status: She is alert.   Psychiatric:         Mood and Affect: Mood normal.         Behavior: Behavior normal.     Allergy testing was performed on Juhi Tammy using standard technique. There were no immediate complications.    Test Results  Controls  Positive Histamine: 5 x 5  Negative Saline: 0  Panel 1  Cat: 0  Cockroach: 0  Cotton: 0  Do  D. Farinae: 5+  D. Pter: 5+  Feather: 0  Phoma: 0  Tree Panel  Naren: 0  Beech: 0  Birch: 0  [place]: 0  [place]: 0  Maple: 0  Oak: 0  Bladen: 0 (Elm 0)  Sycamore: 0  Grass/Misc Tree  Bermuda: 0  Black [place]: 0  Benedicto: 0  [place] Blue: 0  Pisek Fescue:  (Ryan 0)  Orchard: 0  Red Top: 0  Rye Grass: 0  Sweet Vernal: 0  [place]: 0  Weeds  Cocklebur: 0  Common Mugwort: 0  English Plantain: 0  Hemp: 0  Kochia: 0  Lamb's Quarter: 0  Marsh Elder: 0  Pigweed: 0  Nettle:  (Goldenrod 0)  Ragweed: 0  Molds  Alternaria: 0  Aspergillus: 0  Aureobasid: 0  Bipolaris: 0  Cladosporidium: 0  Epicoccum: 0  Fusarium: 0  Geotrichum: 0  Helminthosporium: 0  Penicillium: 0  Animal  Cow: 0  Gerbil: 0  Goat: 0  [place] Pi  Hamster: 0  Horse: 0  Mosquito: 0  Mouse: 0  Rabbit: 0  Rat: 0    Intradermal allergy testing was performed on Juhi Tammy using standard technique. There were no immediate complications.    Test Results  Controls  Intradermal Saline: 0  ID  Cat: 0  Cockroach: 0  Common Weed Mix: 0  Cotton: 0  Do  Feather: 0  KORT Mix: 0  Mold Mix #1: 0  Mold Mix #2: 0  Ragweed: 0  Tree Mix:  0    Skin testing is positive to dust mite.    Assessment/Plan     Allergic rhinitis  She C/O years of recurring sinus issues worse since she had COVID in 2021.  She experiences severe nasal congestion mostly at night, PND, facial pressure, dizziness, itchy eyes and ears and sneezing despite Flonase, azelastine and Nasacort.    Skin testing is positive to dust mite.    She can continue her Flonase, Nasacort and azelastine.    Start Singulair (montelukast) at night.  Reviewed side effects and benefits.      I discussed the options of sublingual Odactra vs allergy immunotherapy.     By signing my name below, I, Kyra Ennis, attest that this documentation has been prepared under the direction and in the presence of Tanesha Florian MD.  All medical record entries made by the Scribe were at my direction and personally dictated by me. I have reviewed the chart and agree that the record accurately reflects my personal performance of the history, physical exam, discussion and plan.

## 2024-09-13 ENCOUNTER — LAB (OUTPATIENT)
Dept: LAB | Facility: LAB | Age: 29
End: 2024-09-13
Payer: COMMERCIAL

## 2024-09-13 DIAGNOSIS — R10.30 LOWER ABDOMINAL PAIN: ICD-10-CM

## 2024-09-13 DIAGNOSIS — R11.0 NAUSEA: ICD-10-CM

## 2024-09-13 LAB
ALBUMIN SERPL BCP-MCNC: 4 G/DL (ref 3.4–5)
ALP SERPL-CCNC: 75 U/L (ref 33–110)
ALT SERPL W P-5'-P-CCNC: 16 U/L (ref 7–45)
ANION GAP SERPL CALC-SCNC: 10 MMOL/L (ref 10–20)
AST SERPL W P-5'-P-CCNC: 15 U/L (ref 9–39)
BILIRUB SERPL-MCNC: 0.3 MG/DL (ref 0–1.2)
BUN SERPL-MCNC: 12 MG/DL (ref 6–23)
CALCIUM SERPL-MCNC: 9.5 MG/DL (ref 8.6–10.3)
CHLORIDE SERPL-SCNC: 104 MMOL/L (ref 98–107)
CO2 SERPL-SCNC: 30 MMOL/L (ref 21–32)
CREAT SERPL-MCNC: 0.8 MG/DL (ref 0.5–1.05)
EGFRCR SERPLBLD CKD-EPI 2021: >90 ML/MIN/1.73M*2
ERYTHROCYTE [DISTWIDTH] IN BLOOD BY AUTOMATED COUNT: 12.4 % (ref 11.5–14.5)
GLUCOSE SERPL-MCNC: 91 MG/DL (ref 74–99)
HCT VFR BLD AUTO: 43.3 % (ref 36–46)
HGB BLD-MCNC: 13.7 G/DL (ref 12–16)
MCH RBC QN AUTO: 28.9 PG (ref 26–34)
MCHC RBC AUTO-ENTMCNC: 31.6 G/DL (ref 32–36)
MCV RBC AUTO: 91 FL (ref 80–100)
NRBC BLD-RTO: 0 /100 WBCS (ref 0–0)
PLATELET # BLD AUTO: 277 X10*3/UL (ref 150–450)
POTASSIUM SERPL-SCNC: 4 MMOL/L (ref 3.5–5.3)
PROT SERPL-MCNC: 7.4 G/DL (ref 6.4–8.2)
RBC # BLD AUTO: 4.74 X10*6/UL (ref 4–5.2)
SODIUM SERPL-SCNC: 140 MMOL/L (ref 136–145)
WBC # BLD AUTO: 9.3 X10*3/UL (ref 4.4–11.3)

## 2024-09-13 PROCEDURE — 83516 IMMUNOASSAY NONANTIBODY: CPT

## 2024-09-13 PROCEDURE — 80053 COMPREHEN METABOLIC PANEL: CPT

## 2024-09-13 PROCEDURE — 85027 COMPLETE CBC AUTOMATED: CPT

## 2024-09-13 PROCEDURE — 36415 COLL VENOUS BLD VENIPUNCTURE: CPT

## 2024-09-14 LAB
GLIADIN PEPTIDE IGA SER IA-ACNC: <1 U/ML
TTG IGA SER IA-ACNC: <1 U/ML

## 2024-09-16 LAB
GLIADIN PEPTIDE IGG SER IA-ACNC: <0.56 FLU (ref 0–4.99)
TTG IGG SER IA-ACNC: <0.82 FLU (ref 0–4.99)

## 2024-09-18 ENCOUNTER — LAB (OUTPATIENT)
Dept: LAB | Facility: LAB | Age: 29
End: 2024-09-18
Payer: COMMERCIAL

## 2024-09-18 DIAGNOSIS — R11.0 NAUSEA: ICD-10-CM

## 2024-09-18 DIAGNOSIS — R10.30 LOWER ABDOMINAL PAIN: ICD-10-CM

## 2024-09-18 PROCEDURE — 83993 ASSAY FOR CALPROTECTIN FECAL: CPT

## 2024-09-20 LAB — CALPROTECTIN STL-MCNT: 14 UG/G

## 2024-09-25 ENCOUNTER — ANESTHESIA EVENT (OUTPATIENT)
Dept: GASTROENTEROLOGY | Facility: EXTERNAL LOCATION | Age: 29
End: 2024-09-25

## 2024-10-04 ENCOUNTER — ANESTHESIA (OUTPATIENT)
Dept: GASTROENTEROLOGY | Facility: EXTERNAL LOCATION | Age: 29
End: 2024-10-04

## 2024-10-04 ENCOUNTER — APPOINTMENT (OUTPATIENT)
Dept: GASTROENTEROLOGY | Facility: EXTERNAL LOCATION | Age: 29
End: 2024-10-04
Payer: COMMERCIAL

## 2024-10-04 VITALS
HEART RATE: 86 BPM | BODY MASS INDEX: 40.93 KG/M2 | TEMPERATURE: 97.9 F | SYSTOLIC BLOOD PRESSURE: 127 MMHG | WEIGHT: 216.8 LBS | HEIGHT: 61 IN | DIASTOLIC BLOOD PRESSURE: 77 MMHG | OXYGEN SATURATION: 99 % | RESPIRATION RATE: 17 BRPM

## 2024-10-04 DIAGNOSIS — K21.9 LARYNGOPHARYNGEAL REFLUX (LPR): Primary | ICD-10-CM

## 2024-10-04 DIAGNOSIS — R14.0 BLOATING: ICD-10-CM

## 2024-10-04 DIAGNOSIS — R11.0 NAUSEA: ICD-10-CM

## 2024-10-04 DIAGNOSIS — R10.30 LOWER ABDOMINAL PAIN: ICD-10-CM

## 2024-10-04 PROCEDURE — 43239 EGD BIOPSY SINGLE/MULTIPLE: CPT | Performed by: INTERNAL MEDICINE

## 2024-10-04 PROCEDURE — 45378 DIAGNOSTIC COLONOSCOPY: CPT | Performed by: INTERNAL MEDICINE

## 2024-10-04 RX ORDER — LIDOCAINE HYDROCHLORIDE 20 MG/ML
INJECTION, SOLUTION INFILTRATION; PERINEURAL AS NEEDED
Status: DISCONTINUED | OUTPATIENT
Start: 2024-10-04 | End: 2024-10-04

## 2024-10-04 RX ORDER — PROPOFOL 10 MG/ML
INJECTION, EMULSION INTRAVENOUS AS NEEDED
Status: DISCONTINUED | OUTPATIENT
Start: 2024-10-04 | End: 2024-10-04

## 2024-10-04 RX ORDER — SODIUM CHLORIDE 9 MG/ML
20 INJECTION, SOLUTION INTRAVENOUS CONTINUOUS
Status: DISCONTINUED | OUTPATIENT
Start: 2024-10-04 | End: 2024-10-05 | Stop reason: HOSPADM

## 2024-10-04 SDOH — HEALTH STABILITY: MENTAL HEALTH: CURRENT SMOKER: 0

## 2024-10-04 ASSESSMENT — ENCOUNTER SYMPTOMS
NAUSEA: 0
ABDOMINAL DISTENTION: 0
LIGHT-HEADEDNESS: 0
SHORTNESS OF BREATH: 0
FEVER: 0
DIZZINESS: 0
HEADACHES: 0
ABDOMINAL PAIN: 0
UNEXPECTED WEIGHT CHANGE: 0
COLOR CHANGE: 0
CONSTIPATION: 0
WHEEZING: 0
DIFFICULTY URINATING: 0
COUGH: 0
SLEEP DISTURBANCE: 0
SPEECH DIFFICULTY: 0
CHILLS: 0
TROUBLE SWALLOWING: 0
VOMITING: 0
CONFUSION: 0
ARTHRALGIAS: 0
DIARRHEA: 0
JOINT SWELLING: 0

## 2024-10-04 ASSESSMENT — PAIN SCALES - GENERAL
PAINLEVEL_OUTOF10: 0 - NO PAIN
PAIN_LEVEL: 0
PAINLEVEL_OUTOF10: 0 - NO PAIN

## 2024-10-04 ASSESSMENT — PAIN - FUNCTIONAL ASSESSMENT
PAIN_FUNCTIONAL_ASSESSMENT: 0-10

## 2024-10-04 NOTE — ANESTHESIA PREPROCEDURE EVALUATION
Patient: Juhi Munoz    Procedure Information       Date/Time: 10/04/24 1230    Scheduled providers: Luis Luna MD    Procedures:       EGD      COLONOSCOPY    Location: [facility]            Relevant Problems   No relevant active problems       Clinical information reviewed:   Tobacco  Allergies  Meds   Med Hx  Surg Hx  OB Status  Fam Hx  Soc   Hx        NPO Detail:  NPO/Void Status  Date of Last Liquid: 10/04/24  Time of Last Liquid: 0700  Date of Last Solid: 10/02/24         Physical Exam    Airway  Mallampati: II  TM distance: >3 FB  Neck ROM: full     Cardiovascular - normal exam     Dental - normal exam     Pulmonary - normal exam  Breath sounds clear to auscultation     Abdominal            Anesthesia Plan    History of general anesthesia?: yes  History of complications of general anesthesia?: no    ASA 3     MAC     The patient is not a current smoker.    intravenous induction   Anesthetic plan and risks discussed with patient.    Plan discussed with CRNA.

## 2024-10-04 NOTE — H&P
History Of Present Illness  Juhi Munoz is a 28 y.o. female presenting with Bloating and lower abdominal pain     Past Medical History  No past medical history on file.  Surgical History  No past surgical history on file.  Social History  She reports that she has quit smoking. Her smoking use included cigarettes. She has never used smokeless tobacco. She reports current alcohol use of about 1.0 - 2.0 standard drink of alcohol per week. She reports that she does not use drugs.    Family History  Family History   Problem Relation Name Age of Onset    Allergies Father      Heart disease Paternal Grandfather      Hypertension Paternal Grandfather          Allergies  Allergies   Allergen Reactions    House Dust Mite Unknown     From allergy test - sinus congestions and itchy eyes     Review of Systems   Constitutional:  Negative for chills, fever and unexpected weight change.   HENT:  Negative for congestion and trouble swallowing.    Respiratory:  Negative for cough, shortness of breath and wheezing.    Cardiovascular:  Negative for chest pain.   Gastrointestinal:  Negative for abdominal distention, abdominal pain, constipation, diarrhea, nausea and vomiting.   Genitourinary:  Negative for difficulty urinating.   Musculoskeletal:  Negative for arthralgias and joint swelling.   Skin:  Negative for color change.   Neurological:  Negative for dizziness, speech difficulty, light-headedness and headaches.   Psychiatric/Behavioral:  Negative for confusion and sleep disturbance.         Physical Exam  Constitutional:       General: She is awake.      Appearance: Normal appearance.   HENT:      Head: Normocephalic and atraumatic.      Nose: Nose normal.      Mouth/Throat:      Mouth: Mucous membranes are moist.   Eyes:      Pupils: Pupils are equal, round, and reactive to light.   Neck:      Thyroid: No thyroid mass.      Trachea: Phonation normal.   Cardiovascular:      Rate and Rhythm: Normal rate and regular rhythm.       Heart sounds: Normal heart sounds. No murmur heard.     No gallop.   Pulmonary:      Effort: Pulmonary effort is normal. No respiratory distress.      Breath sounds: Normal air entry. No decreased breath sounds, wheezing, rhonchi or rales.   Abdominal:      General: Bowel sounds are normal. There is no distension.      Palpations: Abdomen is soft.      Tenderness: There is no abdominal tenderness.   Musculoskeletal:      Cervical back: Neck supple.      Right lower leg: No edema.      Left lower leg: No edema.   Skin:     General: Skin is warm.      Capillary Refill: Capillary refill takes less than 2 seconds.   Neurological:      General: No focal deficit present.      Mental Status: She is alert and oriented to person, place, and time. Mental status is at baseline.      Cranial Nerves: Cranial nerves 2-12 are intact.      Motor: Motor function is intact.   Psychiatric:         Attention and Perception: Attention and perception normal.         Mood and Affect: Mood normal.         Speech: Speech normal.         Behavior: Behavior normal.          Last Recorded Vitals  There were no vitals taken for this visit.    Assessment/Plan   Bloating and lower abdominal pain  EGD   Colonoscopy      Luis Luna MD

## 2024-10-04 NOTE — DISCHARGE INSTRUCTIONS

## 2024-10-04 NOTE — ANESTHESIA POSTPROCEDURE EVALUATION
Patient: Juhi Munoz    Procedure Summary       Date: 10/04/24 Room / Location: [facility]    Anesthesia Start: 1219 Anesthesia Stop:     Procedures:       EGD      COLONOSCOPY Diagnosis:       Laryngopharyngeal reflux (LPR)      Bloating      Nausea      Lower abdominal pain      Laryngopharyngeal reflux (LPR)    Scheduled Providers: Luis Luna MD Responsible Provider: PINO Koroma    Anesthesia Type: MAC ASA Status: 3            Anesthesia Type: MAC    Vitals Value Taken Time   /66 10/04/24 1241   Temp 36.6 °C (97.9 °F) 10/04/24 1241   Pulse 98 10/04/24 1241   Resp 19 10/04/24 1241   SpO2 97 % 10/04/24 1241       Anesthesia Post Evaluation    Patient location during evaluation: bedside  Patient participation: complete - patient cannot participate  Level of consciousness: awake and responsive to verbal stimuli  Pain score: 0  Pain management: adequate  Airway patency: patent  Cardiovascular status: acceptable and hemodynamically stable  Respiratory status: acceptable  Hydration status: acceptable  Postoperative Nausea and Vomiting: none    No notable events documented.

## 2024-10-09 LAB
LABORATORY COMMENT REPORT: NORMAL
PATH REPORT.FINAL DX SPEC: NORMAL
PATH REPORT.GROSS SPEC: NORMAL
PATH REPORT.TOTAL CANCER: NORMAL

## 2024-10-29 ENCOUNTER — APPOINTMENT (OUTPATIENT)
Dept: ALLERGY | Facility: CLINIC | Age: 29
End: 2024-10-29
Payer: COMMERCIAL

## 2024-11-03 NOTE — RESULT ENCOUNTER NOTE
During recent upper endoscopy biopsies were taken from the stomach.  Pathology results show inactive inflammation which is mild.  This is a nonspecific finding.  Please follow-up in GI office as needed.  Sincerely,

## 2024-11-15 ENCOUNTER — APPOINTMENT (OUTPATIENT)
Dept: ALLERGY | Facility: CLINIC | Age: 29
End: 2024-11-15
Payer: COMMERCIAL

## 2024-12-16 NOTE — PROGRESS NOTES
Subjective   Patient ID: Juhi Munoz is a 29 y.o. female who presents for Nausea (Pt states having less bloating and nausea/reports getting some GERD but only when she eats something that triggers it. ).  HPI  Patient is seen in follow-up for functional dyspepsia and irritable bowel syndrome.    Has been following Low FODMAP diet.   Symptoms are 75% better.   Stopped coffee.    NO taking dairy.     [facility] : [facility].    Anxiety is controlled.     Follows meditations. Stress comes usually from work.     BM: daily 2-3 a day no blood.           Current Outpatient Medications on File Prior to Visit   Medication Sig Dispense Refill    albuterol (Ventolin HFA) 90 mcg/actuation inhaler INHALE 2 PUFFS INTO THE LUNGS EVERY 4 HOURS AS NEEDED FOR SHORTNESS OF BREATH OR WHEEZING      azelastine (Astelin) 137 mcg (0.1 %) nasal spray Administer 2 sprays into each nostril twice daily. 30 mL 11    bisacodyl (Dulcolax) 5 mg EC tablet Take 2 tablets (10 mg) by mouth.      cholecalciferol (Vitamin D-3) 50 mcg (2,000 unit) capsule Take 1 capsule (50 mcg) by mouth once daily.      cyanocobalamin (Vitamin B-12) 500 mcg tablet Take 1 tablet (500 mcg) by mouth once daily.      loratadine (Claritin) 10 mg tablet Take 1 tablet (10 mg) by mouth.      montelukast (Singulair) 10 mg tablet Take 1 tablet (10 mg) by mouth once daily. 30 tablet 5    naproxen (Naprosyn) 500 mg tablet       pseudoephedrine (Sudafed) 60 mg tablet Take 1 tablet (60 mg) by mouth every 6 hours if needed.      triamcinolone (Nasacort) 55 mcg nasal inhaler Administer 2 sprays into each nostril one daily. 16.5 g 1     No current facility-administered medications on file prior to visit.        Review of Systems   Constitutional:  Negative for chills, fever and unexpected weight change.   HENT:  Negative for congestion and trouble swallowing.    Respiratory:  Negative for cough, shortness of breath and wheezing.    Cardiovascular:  Negative for chest pain.    Gastrointestinal:  Negative for abdominal distention, abdominal pain, constipation, diarrhea, nausea and vomiting.   Genitourinary:  Negative for difficulty urinating.   Musculoskeletal:  Negative for arthralgias and joint swelling.   Skin:  Negative for color change.   Neurological:  Negative for dizziness, speech difficulty, light-headedness and headaches.   Psychiatric/Behavioral:  Negative for confusion and sleep disturbance.        Objective   Physical Exam  Constitutional:       General: She is awake.      Appearance: Normal appearance.   HENT:      Head: Normocephalic and atraumatic.      Nose: Nose normal.      Mouth/Throat:      Mouth: Mucous membranes are moist.   Eyes:      Pupils: Pupils are equal, round, and reactive to light.   Neck:      Thyroid: No thyroid mass.      Trachea: Phonation normal.   Cardiovascular:      Rate and Rhythm: Normal rate and regular rhythm.      Heart sounds: Normal heart sounds. No murmur heard.     No gallop.   Pulmonary:      Effort: Pulmonary effort is normal. No respiratory distress.      Breath sounds: Normal air entry. No decreased breath sounds, wheezing, rhonchi or rales.   Abdominal:      General: Bowel sounds are normal. There is no distension.      Palpations: Abdomen is soft.      Tenderness: There is no abdominal tenderness.   Musculoskeletal:      Cervical back: Neck supple.      Right lower leg: No edema.      Left lower leg: No edema.   Skin:     General: Skin is warm.      Capillary Refill: Capillary refill takes less than 2 seconds.   Neurological:      General: No focal deficit present.      Mental Status: She is alert and oriented to person, place, and time. Mental status is at baseline.      Cranial Nerves: Cranial nerves 2-12 are intact.      Motor: Motor function is intact.   Psychiatric:         Attention and Perception: Attention and perception normal.         Mood and Affect: Mood normal.         Speech: Speech normal.         Behavior: Behavior  "normal.       /86 (BP Location: Right arm, Patient Position: Sitting, BP Cuff Size: Large adult)   Pulse 76   Resp 18   Ht 1.549 m (5' 1\")   Wt 103 kg (226 lb 9.6 oz)   SpO2 98%   BMI 42.82 kg/m²      Lab Results   Component Value Date    WBC 9.3 09/13/2024    HGB 13.7 09/13/2024    HCT 43.3 09/13/2024    MCV 91 09/13/2024     09/13/2024           No lab exists for component: \"LABALBU\"    No results found for: \"AFP\"  No results found for: \"TSH\"    Colonoscopy  Order# [id number]  Reading physician: Luis Luna MD Ordering provider: Luis Luna MD Study date: 10/4/24     Result Information    Status: Final result (Exam End: 10/4/2024 12:38) Provider Status: Open     Result Text    Result Text   Impression  The terminal ileum and entire colon appeared normal.  Small hemorrhoids        Findings  The terminal ileum and entire colon appeared normal.  Internal small hemorrhoids        Recommendation  Follow up with PCP   Low FODMAP diet.   If symptoms persist then follow up In my office.         Esophagogastroduodenoscopy (EGD)  Order# [id number]  Reading physician: Luis Luna MD Ordering provider: Luis Luna MD Study date: 10/4/24     Result Information    Status: Final result (Exam End: 10/4/2024 12:38) Provider Status: Open     Result Text    Result Text   Impression  The duodenal bulb and 2nd part of the duodenum appeared normal.  The cardia, fundus of the stomach and body of the stomach appeared normal. Performed random biopsy to rule out H. pylori.        Findings  The duodenal bulb and 2nd part of the duodenum appeared normal.  The cardia, fundus of the stomach and body of the stomach appeared normal. Performed random biopsy using biopsy forceps to rule out H. pylori.  Regular Z-line 36 cm from the incisors        Recommendation  Await pathology results   Low FODMAP diet.   Take Prilosec 20mg OTC as needed for Acid reflux  Follow up as needed.   Call with questions.     "         Assessment/Plan   Diagnoses and all orders for this visit:  Bloating  Nausea  Lower abdominal pain  Low FODMAP diet.   Symptoms are significantly improved with Life style modifications.  75% improvement in symptoms  Follow up in the office in 1 year.   Call with questions.

## 2024-12-19 ENCOUNTER — APPOINTMENT (OUTPATIENT)
Dept: GASTROENTEROLOGY | Facility: CLINIC | Age: 29
End: 2024-12-19
Payer: COMMERCIAL

## 2024-12-19 VITALS
DIASTOLIC BLOOD PRESSURE: 86 MMHG | HEIGHT: 61 IN | BODY MASS INDEX: 42.78 KG/M2 | SYSTOLIC BLOOD PRESSURE: 128 MMHG | WEIGHT: 226.6 LBS | OXYGEN SATURATION: 98 % | HEART RATE: 76 BPM | RESPIRATION RATE: 18 BRPM

## 2024-12-19 DIAGNOSIS — R11.0 NAUSEA: ICD-10-CM

## 2024-12-19 DIAGNOSIS — R10.30 LOWER ABDOMINAL PAIN: ICD-10-CM

## 2024-12-19 DIAGNOSIS — R14.0 BLOATING: Primary | ICD-10-CM

## 2024-12-19 PROCEDURE — 3008F BODY MASS INDEX DOCD: CPT | Performed by: INTERNAL MEDICINE

## 2024-12-19 PROCEDURE — 99214 OFFICE O/P EST MOD 30 MIN: CPT | Performed by: INTERNAL MEDICINE

## 2024-12-19 PROCEDURE — 1036F TOBACCO NON-USER: CPT | Performed by: INTERNAL MEDICINE

## 2024-12-19 ASSESSMENT — ENCOUNTER SYMPTOMS
DIARRHEA: 0
CHILLS: 0
SLEEP DISTURBANCE: 0
NAUSEA: 0
VOMITING: 0
COLOR CHANGE: 0
CONSTIPATION: 0
LIGHT-HEADEDNESS: 0
ARTHRALGIAS: 0
DIZZINESS: 0
DIFFICULTY URINATING: 0
CONFUSION: 0
SPEECH DIFFICULTY: 0
FEVER: 0
SHORTNESS OF BREATH: 0
TROUBLE SWALLOWING: 0
ABDOMINAL PAIN: 0
COUGH: 0
WHEEZING: 0
UNEXPECTED WEIGHT CHANGE: 0
JOINT SWELLING: 0
HEADACHES: 0
ABDOMINAL DISTENTION: 0

## 2024-12-19 NOTE — PATIENT INSTRUCTIONS
Bloating  Nausea  Lower abdominal pain  Low FODMAP diet.   Symptoms are significantly with Life style modifications.   Follow up in the office in 1 year.   Call with questions.

## 2025-04-10 ENCOUNTER — HOSPITAL ENCOUNTER (EMERGENCY)
Facility: HOSPITAL | Age: 30
Discharge: HOME | End: 2025-04-10
Payer: COMMERCIAL

## 2025-04-10 ENCOUNTER — APPOINTMENT (OUTPATIENT)
Dept: RADIOLOGY | Facility: HOSPITAL | Age: 30
End: 2025-04-10
Payer: COMMERCIAL

## 2025-04-10 VITALS
SYSTOLIC BLOOD PRESSURE: 150 MMHG | HEART RATE: 82 BPM | RESPIRATION RATE: 16 BRPM | HEIGHT: 61 IN | TEMPERATURE: 96.8 F | WEIGHT: 217 LBS | BODY MASS INDEX: 40.97 KG/M2 | DIASTOLIC BLOOD PRESSURE: 65 MMHG | OXYGEN SATURATION: 98 %

## 2025-04-10 DIAGNOSIS — S60.031A CONTUSION OF RIGHT MIDDLE FINGER WITHOUT DAMAGE TO NAIL, INITIAL ENCOUNTER: Primary | ICD-10-CM

## 2025-04-10 PROCEDURE — 99283 EMERGENCY DEPT VISIT LOW MDM: CPT

## 2025-04-10 PROCEDURE — 73130 X-RAY EXAM OF HAND: CPT | Mod: RIGHT SIDE | Performed by: RADIOLOGY

## 2025-04-10 PROCEDURE — 73130 X-RAY EXAM OF HAND: CPT | Mod: RT

## 2025-04-10 ASSESSMENT — COLUMBIA-SUICIDE SEVERITY RATING SCALE - C-SSRS
1. IN THE PAST MONTH, HAVE YOU WISHED YOU WERE DEAD OR WISHED YOU COULD GO TO SLEEP AND NOT WAKE UP?: NO
2. HAVE YOU ACTUALLY HAD ANY THOUGHTS OF KILLING YOURSELF?: NO
6. HAVE YOU EVER DONE ANYTHING, STARTED TO DO ANYTHING, OR PREPARED TO DO ANYTHING TO END YOUR LIFE?: NO

## 2025-04-10 NOTE — ED PROVIDER NOTES
Limitations to History: none  External Records Reviewed  Independent Historians: self  Social determinants affecting care: none    HPI  Juhi Munoz is a 29 y.o. female who presents emergency department for assessment of right third digit injury.  She reports that she was at work and she accidentally closed her finger in the door.  She is reporting pain to her right distal third digit.  She does report that there is a wound there that she quickly put a dressing over.  She is up-to-date on her tetanus immunization.  She denies any other injuries.  She denies any loss of function of the finger.  She has no further complaints    [facility]  Past Medical History:   Diagnosis Date    GERD (gastroesophageal reflux disease)     reviewed by myself.    Meds  Current Outpatient Medications   Medication Instructions    albuterol (Ventolin HFA) 90 mcg/actuation inhaler INHALE 2 PUFFS INTO THE LUNGS EVERY 4 HOURS AS NEEDED FOR SHORTNESS OF BREATH OR WHEEZING    azelastine (Astelin) 137 mcg (0.1 %) nasal spray Administer 2 sprays into each nostril twice daily.    bisacodyl (DULCOLAX) 10 mg    cholecalciferol (VITAMIN D-3) 2,000 Units, Daily RT    cyanocobalamin (VITAMIN B-12) 500 mcg, Daily RT    loratadine (CLARITIN) 10 mg    montelukast (SINGULAIR) 10 mg, oral, Daily    naproxen (Naprosyn) 500 mg tablet     pseudoephedrine (SUDAFED) 60 mg, Every 6 hours PRN    triamcinolone (Nasacort) 55 mcg nasal inhaler Administer 2 sprays into each nostril one daily.       Allergies  Allergies   Allergen Reactions    House Dust Mite Unknown     From allergy test - sinus congestions and itchy eyes    reviewed by myself.    SHx  Social History     Tobacco Use    Smoking status: Former     Types: Cigarettes    Smokeless tobacco: Never   Substance Use Topics    Alcohol use: Yes     Alcohol/week: 1.0 - 2.0 standard drink of alcohol     Types: 1 - 2 Standard drinks or equivalent per week    Drug use: Never    reviewed by  "myself.      ------------------------------------------------------------------------------------------------------------------------------------------    /65 (BP Location: Left arm, Patient Position: Sitting)   Pulse 82   Temp 36 °C (96.8 °F) (Temporal)   Resp 16   Ht 1.549 m (5' 1\")   Wt 98.4 kg (217 lb)   SpO2 98%   BMI 41.00 kg/m²     Physical Exam  Vitals and nursing note reviewed.   Constitutional:       Appearance: Normal appearance. She is normal weight.   HENT:      Head: Normocephalic.      Nose: Nose normal.      Mouth/Throat:      Mouth: Mucous membranes are moist.   Eyes:      Extraocular Movements: Extraocular movements intact.      Conjunctiva/sclera: Conjunctivae normal.   Cardiovascular:      Rate and Rhythm: Normal rate and regular rhythm.   Pulmonary:      Effort: Pulmonary effort is normal.      Breath sounds: Normal breath sounds.   Musculoskeletal:         General: Normal range of motion.      Cervical back: Neck supple.      Comments: Full active range of motion of the digit of the right hand with extension and flexion.  Sensation intact distally.  Capillary refill is brisk.  There is tenderness palpation of the right third digit distally.   Skin:     General: Skin is warm and dry.      Comments: Superficial laceration t about 1 cm in size o the palmar surface of the left third digit distally.  There is no active bleeding. Small area of bruising.  Nail is intact.   Neurological:      Mental Status: She is alert and oriented to person, place, and time.   Psychiatric:         Attention and Perception: Attention normal.         Mood and Affect: Mood normal.          ------------------------------------------------------------------------------------------------------------------------------------------  Labs  Labs Reviewed - No data to display     Imaging  XR hand right 3+ views   Final Result   No acute osseous abnormalities.   Signed by Adan Arredondo MD           ED Course  Diagnoses " as of 04/10/25 1051   Contusion of right middle finger without damage to nail, initial encounter        Medical Decision Making: She did not appear ill or toxic.  Vital signs reviewed.  She is slightly hypertensive.  Otherwise hemodynamically stable.  Will obtain x-ray.  Wound was cleaned with normal saline and Hibiclens.  Wound was explored.  This is very superficial.  Dermabond was placed.    Differential diagnoses considered: fracture, contusion, others    X-ray of the hand showing no acute fracture.  Discussed this with the patient.  I recommend using ice and taking Tylenol or ibuprofen for pain control.  She is to return to ER if symptoms change or worsen.  She verbalized understanding and agreed to plan of care.  She was discharged home in stable condition.    Diagnosis: Finger injury  Plan: discharge     Power Moreno PA-C  04/10/25 1050

## 2025-04-10 NOTE — DISCHARGE INSTRUCTIONS
Rest, ice, elevate your finger due to injury.  Keep your wound clean and dry.  Tylenol or ibuprofen for pain.  You can also use ice.  Look for signs of infection such as redness, streaking, drainage, fevers.  Return to ER if symptoms change or worsen

## 2025-04-10 NOTE — ED TRIAGE NOTES
PATIENT WALKED IN WITH COMPLAINTS OF A FINGER INJURY TO THE RIGHT MIDDLE FINGER. FINGER CAUGHT IN DOOR HINGES. THE PATIENT DISCLOSES 10/10 PAIN. DENIES SOB OR CHEST PAIN